# Patient Record
Sex: FEMALE | Race: WHITE | Employment: OTHER | URBAN - METROPOLITAN AREA
[De-identification: names, ages, dates, MRNs, and addresses within clinical notes are randomized per-mention and may not be internally consistent; named-entity substitution may affect disease eponyms.]

---

## 2017-01-01 ENCOUNTER — TELEPHONE (OUTPATIENT)
Dept: INTERNAL MEDICINE | Facility: CLINIC | Age: 82
End: 2017-01-01

## 2017-01-01 ENCOUNTER — MEDICAL CORRESPONDENCE (OUTPATIENT)
Dept: HEALTH INFORMATION MANAGEMENT | Facility: CLINIC | Age: 82
End: 2017-01-01

## 2017-01-01 DIAGNOSIS — G43.009 MIGRAINE WITHOUT AURA AND WITHOUT STATUS MIGRAINOSUS, NOT INTRACTABLE: Primary | ICD-10-CM

## 2017-01-01 RX ORDER — SUMATRIPTAN 100 MG/1
100 TABLET, FILM COATED ORAL
Qty: 9 TABLET | Refills: 9 | Status: SHIPPED | OUTPATIENT
Start: 2017-01-01

## 2017-01-03 NOTE — TELEPHONE ENCOUNTER
Imitrex      Last Written Prescription Date: 10/12/16  Last Fill Quantity: 9, # refills: 1  Last Office Visit with [facility], [facility] or [facility] prescribing provider: 10/12/16       BP Readings from Last 3 Encounters:   10/12/16 132/88   02/01/16 130/78   11/02/15 140/88

## 2017-03-16 NOTE — LETTER
[facility]  [address], [address]  [place]  [address]  [phone]      March 18, 2017    RE:  Josephine Gonzalez                                                                                                                                                       [address] [facility] [address]            To whom it may concern:    Josephine Gonzalez is under my professional care. She has severe dementia and is completely unable to manage her financial affairs.    If you have any questions or if you need additional information in regard to this matter, please feel free to call or contact me at [facility], [address] [facility] [address] or phone # [phone].           Sincerely,        Margarette Marquez M.D.  Department of Internal Medicine

## 2017-03-16 NOTE — TELEPHONE ENCOUNTER
This is going to need to be done by her primary provider since she has not been seeing any other provider that is here today.

## 2017-03-16 NOTE — TELEPHONE ENCOUNTER
Reason for Call:  Other Letter of Health    Detailed comments: Son (Riley) calling stating that pt's spouse recently passed.  Son (POA) trying to get social security set up for pt, requiring a hand signed/dated letter stating  Pt has dementia/alzheimers.  Son requesting to  today.    Phone Number Patient can be reached at: Other phone number:  [phone] Lillian Lin (son)*    Best Time: anytime    Can we leave a detailed message on this number? YES    Call taken on 3/16/2017 at 1:23 PM by CHAIM SEN

## 2017-04-17 NOTE — TELEPHONE ENCOUNTER
Panel Management Review      Patient has the following on her problem list:     Depression / Dysthymia review  PHQ-9 SCORE 7/30/2013 10/2/2014 10/12/2016   Total Score 7 13 -   Total Score - - 25      Patient is due for:  PHQ9    Hypertension   Last three blood pressure readings:  BP Readings from Last 3 Encounters:   10/12/16 132/88   02/01/16 130/78   11/02/15 140/88     Blood pressure: Passed    HTN Guidelines:  Age 18-59 BP range:  Less than 140/90  Age 60-85 with Diabetes:  Less than 140/90  Age 60-85 without Diabetes:  less than 150/90      Composite cancer screening  Chart review shows that this patient is due/due soon for the following None  Summary:    Patient is due/failing the following:   DAP and PHQ9    Action needed:   Routed to provider for review.    Type of outreach:    None, routed to provider for review.    Questions for provider review:    When to follow up BP? Can patient do PHQ9 with Alzheimer's? Needs depression action plan.                                                                                                                                   MICHELA Soto LPN       Chart routed to Provider.

## 2017-04-18 NOTE — TELEPHONE ENCOUNTER
She is basically comfort care. So ignore health maintenance, and she refuses to take her depression meds so again no need for PHQ9

## 2017-06-02 ENCOUNTER — TELEPHONE (OUTPATIENT)
Dept: INTERNAL MEDICINE | Facility: CLINIC | Age: 82
End: 2017-06-02